# Patient Record
Sex: MALE | Race: WHITE
[De-identification: names, ages, dates, MRNs, and addresses within clinical notes are randomized per-mention and may not be internally consistent; named-entity substitution may affect disease eponyms.]

---

## 2018-05-25 ENCOUNTER — HOSPITAL ENCOUNTER (OUTPATIENT)
Dept: HOSPITAL 97 - OR | Age: 58
Discharge: HOME | End: 2018-05-25
Attending: OTOLARYNGOLOGY
Payer: COMMERCIAL

## 2018-05-25 DIAGNOSIS — G47.33: ICD-10-CM

## 2018-05-25 DIAGNOSIS — I10: ICD-10-CM

## 2018-05-25 DIAGNOSIS — J32.0: Primary | ICD-10-CM

## 2018-05-25 DIAGNOSIS — Z88.3: ICD-10-CM

## 2018-05-25 DIAGNOSIS — E66.01: ICD-10-CM

## 2018-05-25 DIAGNOSIS — J34.89: ICD-10-CM

## 2018-05-25 DIAGNOSIS — Z88.6: ICD-10-CM

## 2018-05-25 PROCEDURE — 099Q8ZZ DRAINAGE OF RIGHT MAXILLARY SINUS, VIA NATURAL OR ARTIFICIAL OPENING ENDOSCOPIC: ICD-10-PCS

## 2018-05-25 PROCEDURE — 88304 TISSUE EXAM BY PATHOLOGIST: CPT

## 2018-05-25 PROCEDURE — 88305 TISSUE EXAM BY PATHOLOGIST: CPT

## 2018-05-25 PROCEDURE — 88312 SPECIAL STAINS GROUP 1: CPT

## 2018-05-25 PROCEDURE — 93005 ELECTROCARDIOGRAM TRACING: CPT

## 2018-05-25 PROCEDURE — 099R8ZZ DRAINAGE OF LEFT MAXILLARY SINUS, VIA NATURAL OR ARTIFICIAL OPENING ENDOSCOPIC: ICD-10-PCS

## 2018-05-25 RX ADMIN — OXYMETAZOLINE HYDROCHLORIDE ONE APPL: 5 SPRAY NASAL at 10:13

## 2018-05-25 RX ADMIN — OXYMETAZOLINE HYDROCHLORIDE ONE APPL: 5 SPRAY NASAL at 10:19

## 2018-05-25 RX ADMIN — OXYMETAZOLINE HYDROCHLORIDE ONE APPL: 5 SPRAY NASAL at 10:24

## 2018-05-25 NOTE — EKG
Test Date:    2018-05-24               Test Time:    11:57:06

Technician:   ODILIA                                    

                                                     

MEASUREMENT RESULTS:                                       

Intervals:                                           

Rate:         60                                     

GA:           160                                    

QRSD:         98                                     

QT:           422                                    

QTc:          422                                    

Axis:                                                

P:            51                                     

GA:           160                                    

QRS:          38                                     

T:            35                                     

                                                     

INTERPRETIVE STATEMENTS:                                       

                                                     

Normal sinus rhythm with sinus arrhythmia

Normal ECG

No previous ECG available for comparison



Electronically Signed On 05-25-18 06:55:36 CDT by Taqueria Spear

## 2018-05-25 NOTE — P.BOP
Preoperative diagnosis: CRS


Postoperative diagnosis: same


Primary procedure: B NE with max antrostomy


Assistant: NONE,NONE


Estimated blood loss: 20ml


Specimen: L sinus contents


Anesthesia: General


Implants: Xerogel B MM


Fluids & blood products: crystalloid 450ml


Transferred to: Recovery Room


Condition: Good

## 2018-05-29 NOTE — OP
Date of Procedure:  05/25/2018



Surgeon:  Fabiola Mclain MD



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 
sinus syndrome.



Postoperative Diagnosis:  Bilateral maxillary chronic sinusitis with left 
silent sinus syndrome.



Procedure:  Bilateral nasal endoscopy with maxillary antrostomy.



Indication For Procedure:  Mr. Issa is a 58-year-old seen in clinic with 
history of chronic sinus symptoms including congestion and pressure unresolved 
with the antibiotic treatment.  He underwent an allergy testing which was 
negative last summer.  He underwent a CT scan of sinuses ordered by Dr. Good 
and was seen in my clinic in the Spring for review and recommendations for 
treatment.  The risks, benefits, and alternatives of surgery were discussed 
with the patient and he agreed to proceed.



Procedure In Detail:  The patient was brought to the operating room.  He was 
placed under general anesthesia via oral endotracheal tube.  The head of bed 
was turned 90 degrees and the patient's nasal hairs were trimmed.  The nasal 
cavity was packed with Afrin-soaked pledgets and the endoscope and light source 
were prepared for use.  The pledgets were removed and a 0-degree endoscope was 
used to perform a nasal endoscopy. The septum was midline, the inferior 
turbinates were unremarkable.  The middle turbinates were mildly lateralized.  
The right middle meatus was normal in its anatomy and structure.  The left 
middle meatus showed significant collapse of the lateral nasal wall and 
uncinate process consistent with radiographic finding.  A maxillary seeker was 
used to carefully palpate the uncinate process and the bilateral uncinate 
processes were injected with 1% lidocaine with epinephrine.  After time for 
effect, a right maxillary antrostomy was performed by removal of the uncinate 
process with the backbiter and 90-degree Blackly.  The maxillary antroscopy was 
refined by removal of soft tissue using a 90-degree Blackly.  The right sinus 
was then forcefully irrigated with several aliquots of saline and the middle 
meatus was packed with afrin-soaked pledgets to aid in hemostasis.  Attention 
was then turned to the left side.  The uncinate as noted above was 
significantly lateralized and was carefully palpated with a maxillary seeker.  
The uncinate process was removed using a backbiter and 90-degree Blackly.  At 
the maxillary top, it was carefully refined using the 90-degree Blackly.  With 
a 30 and 70-degree endoscope to aid in visualization, the maxillary sinus was 
noted to be completely filled with a thick white gelatinous debris.  This was 
partially suctioned and a curved syringe was used to forcefully irrigate the 
sinus and aid in removal of the debris.  A 12-[place] suction was used and a 
large 3 x 2 inch piece of debris was removed.  This was very very thick and 
very sticky and was sent to pathology as left maxillary sinus content.  
Additional irrigation was performed and there was no further debris noted 
within the maxillary sinus.  Of note, with a 70-degree scope, visualization of 
the left maxillary sinus cavity was notable for a depressed orbital floor/
maxillary roof and great care was taken to avoid any destruction of this.  
After removal of all the pledgets, photodocumentation of the maxillary 
antrostomy was obtained and a Xerogel dissolvable nasal dressing was placed 
within the bilateral middle meatus and thoroughly saturated with sterile 
saline.  The nasopharynx was suctioned and the sinuses appeared to be 
hemostatic.  The patient was returned to care of the anesthesia for awakening, 
extubation in the operating room, which proceeded without difficulty.  The 
patient will be discharged home later today in the care of his family and 
follow up with Dr. Mclain in 10-14 days.





CLAU/LEON

DD:  05/25/2018 16:30:21   Voice ID:  [id number]

DT:  05/26/2018 02:31:42   Report ID:  [id number]

ISABELL